# Patient Record
Sex: FEMALE | Race: WHITE | Employment: OTHER | URBAN - NONMETROPOLITAN AREA
[De-identification: names, ages, dates, MRNs, and addresses within clinical notes are randomized per-mention and may not be internally consistent; named-entity substitution may affect disease eponyms.]

---

## 2017-01-01 ENCOUNTER — HOSPITAL ENCOUNTER (EMERGENCY)
Facility: HOSPITAL | Age: 82
Discharge: HOME | End: 2017-11-30
Attending: FAMILY MEDICINE | Admitting: FAMILY MEDICINE
Payer: MEDICARE

## 2017-01-01 ENCOUNTER — DOCUMENTATION ONLY (OUTPATIENT)
Dept: FAMILY MEDICINE | Facility: OTHER | Age: 82
End: 2017-01-01

## 2017-01-01 ENCOUNTER — HOSPITAL ENCOUNTER (EMERGENCY)
Facility: HOSPITAL | Age: 82
Discharge: HOME | End: 2017-10-30
Attending: PHYSICIAN ASSISTANT | Admitting: PHYSICIAN ASSISTANT
Payer: MEDICARE

## 2017-01-01 ENCOUNTER — MEDICAL CORRESPONDENCE (OUTPATIENT)
Dept: HEALTH INFORMATION MANAGEMENT | Facility: HOSPITAL | Age: 82
End: 2017-01-01

## 2017-01-01 ENCOUNTER — APPOINTMENT (OUTPATIENT)
Dept: GENERAL RADIOLOGY | Facility: HOSPITAL | Age: 82
End: 2017-01-01
Attending: PHYSICIAN ASSISTANT
Payer: MEDICARE

## 2017-01-01 ENCOUNTER — NURSING HOME VISIT (OUTPATIENT)
Dept: FAMILY MEDICINE | Facility: OTHER | Age: 82
End: 2017-01-01
Payer: MEDICARE

## 2017-01-01 ENCOUNTER — HOSPITAL ENCOUNTER (OUTPATIENT)
Dept: CARDIOLOGY | Facility: HOSPITAL | Age: 82
End: 2017-11-15
Attending: FAMILY MEDICINE
Payer: MEDICARE

## 2017-01-01 ENCOUNTER — HOSPITAL ENCOUNTER (OUTPATIENT)
Dept: CARDIOLOGY | Facility: HOSPITAL | Age: 82
Discharge: HOME | End: 2017-11-13
Attending: FAMILY MEDICINE | Admitting: FAMILY MEDICINE
Payer: MEDICARE

## 2017-01-01 ENCOUNTER — APPOINTMENT (OUTPATIENT)
Dept: GENERAL RADIOLOGY | Facility: HOSPITAL | Age: 82
End: 2017-01-01
Attending: FAMILY MEDICINE
Payer: MEDICARE

## 2017-01-01 ENCOUNTER — HOSPITAL ENCOUNTER (OUTPATIENT)
Dept: CARDIOLOGY | Facility: HOSPITAL | Age: 82
End: 2017-11-14
Attending: FAMILY MEDICINE
Payer: MEDICARE

## 2017-01-01 VITALS
HEIGHT: 60 IN | OXYGEN SATURATION: 98 % | RESPIRATION RATE: 18 BRPM | SYSTOLIC BLOOD PRESSURE: 140 MMHG | WEIGHT: 164 LBS | BODY MASS INDEX: 32.2 KG/M2 | TEMPERATURE: 97.8 F | HEART RATE: 63 BPM | DIASTOLIC BLOOD PRESSURE: 57 MMHG

## 2017-01-01 VITALS
HEART RATE: 33 BPM | RESPIRATION RATE: 14 BRPM | SYSTOLIC BLOOD PRESSURE: 173 MMHG | TEMPERATURE: 96.5 F | DIASTOLIC BLOOD PRESSURE: 64 MMHG | OXYGEN SATURATION: 98 %

## 2017-01-01 VITALS
SYSTOLIC BLOOD PRESSURE: 143 MMHG | RESPIRATION RATE: 22 BRPM | DIASTOLIC BLOOD PRESSURE: 58 MMHG | TEMPERATURE: 97.4 F | OXYGEN SATURATION: 96 %

## 2017-01-01 DIAGNOSIS — F41.9 ANXIETY: Primary | ICD-10-CM

## 2017-01-01 DIAGNOSIS — J90 PLEURAL EFFUSION: ICD-10-CM

## 2017-01-01 DIAGNOSIS — R00.1 BRADYCARDIA: ICD-10-CM

## 2017-01-01 DIAGNOSIS — I35.0 AORTIC STENOSIS: Primary | ICD-10-CM

## 2017-01-01 DIAGNOSIS — Z78.9 NURSING HOME RESIDENT: Primary | ICD-10-CM

## 2017-01-01 DIAGNOSIS — I35.0 AORTIC STENOSIS: ICD-10-CM

## 2017-01-01 DIAGNOSIS — N18.4 CKD (CHRONIC KIDNEY DISEASE) STAGE 4, GFR 15-29 ML/MIN (H): ICD-10-CM

## 2017-01-01 DIAGNOSIS — R00.1 JUNCTIONAL BRADYCARDIA: ICD-10-CM

## 2017-01-01 DIAGNOSIS — N39.0 UTI (URINARY TRACT INFECTION), BACTERIAL: ICD-10-CM

## 2017-01-01 DIAGNOSIS — A49.9 UTI (URINARY TRACT INFECTION), BACTERIAL: ICD-10-CM

## 2017-01-01 DIAGNOSIS — R05.9 COUGH: ICD-10-CM

## 2017-01-01 DIAGNOSIS — I48.91 ATRIAL FIBRILLATION, UNSPECIFIED TYPE (H): ICD-10-CM

## 2017-01-01 LAB
ALBUMIN SERPL-MCNC: 3.4 G/DL (ref 3.4–5)
ALBUMIN SERPL-MCNC: 3.6 G/DL (ref 3.4–5)
ALBUMIN UR-MCNC: 10 MG/DL
ALBUMIN UR-MCNC: 10 MG/DL
ALP SERPL-CCNC: 103 U/L (ref 40–150)
ALP SERPL-CCNC: 97 U/L (ref 40–150)
ALT SERPL W P-5'-P-CCNC: 27 U/L (ref 0–50)
ALT SERPL W P-5'-P-CCNC: 35 U/L (ref 0–50)
ANION GAP SERPL CALCULATED.3IONS-SCNC: 7 MMOL/L (ref 3–14)
ANION GAP SERPL CALCULATED.3IONS-SCNC: 7 MMOL/L (ref 3–14)
APPEARANCE UR: ABNORMAL
APPEARANCE UR: CLEAR
AST SERPL W P-5'-P-CCNC: 26 U/L (ref 0–45)
AST SERPL W P-5'-P-CCNC: 33 U/L (ref 0–45)
BACTERIA #/AREA URNS HPF: ABNORMAL /HPF
BACTERIA #/AREA URNS HPF: ABNORMAL /HPF
BACTERIA SPEC CULT: ABNORMAL
BASOPHILS # BLD AUTO: 0 10E9/L (ref 0–0.2)
BASOPHILS # BLD AUTO: 0 10E9/L (ref 0–0.2)
BASOPHILS NFR BLD AUTO: 0.3 %
BASOPHILS NFR BLD AUTO: 0.3 %
BILIRUB SERPL-MCNC: 0.7 MG/DL (ref 0.2–1.3)
BILIRUB SERPL-MCNC: 0.9 MG/DL (ref 0.2–1.3)
BILIRUB UR QL STRIP: NEGATIVE
BILIRUB UR QL STRIP: NEGATIVE
BUN SERPL-MCNC: 48 MG/DL (ref 7–30)
BUN SERPL-MCNC: 64 MG/DL (ref 7–30)
CALCIUM SERPL-MCNC: 8.7 MG/DL (ref 8.5–10.1)
CALCIUM SERPL-MCNC: 8.8 MG/DL (ref 8.5–10.1)
CHLORIDE SERPL-SCNC: 104 MMOL/L (ref 94–109)
CHLORIDE SERPL-SCNC: 106 MMOL/L (ref 94–109)
CO2 SERPL-SCNC: 29 MMOL/L (ref 20–32)
CO2 SERPL-SCNC: 29 MMOL/L (ref 20–32)
COLOR UR AUTO: ABNORMAL
COLOR UR AUTO: YELLOW
CREAT SERPL-MCNC: 2.21 MG/DL (ref 0.52–1.04)
CREAT SERPL-MCNC: 2.47 MG/DL (ref 0.52–1.04)
DIFFERENTIAL METHOD BLD: ABNORMAL
DIFFERENTIAL METHOD BLD: ABNORMAL
EOSINOPHIL # BLD AUTO: 0 10E9/L (ref 0–0.7)
EOSINOPHIL # BLD AUTO: 0.1 10E9/L (ref 0–0.7)
EOSINOPHIL NFR BLD AUTO: 0.2 %
EOSINOPHIL NFR BLD AUTO: 0.9 %
ERYTHROCYTE [DISTWIDTH] IN BLOOD BY AUTOMATED COUNT: 14.2 % (ref 10–15)
ERYTHROCYTE [DISTWIDTH] IN BLOOD BY AUTOMATED COUNT: 14.8 % (ref 10–15)
GFR SERPL CREATININE-BSD FRML MDRD: 18 ML/MIN/1.7M2
GFR SERPL CREATININE-BSD FRML MDRD: 21 ML/MIN/1.7M2
GLUCOSE SERPL-MCNC: 107 MG/DL (ref 70–99)
GLUCOSE SERPL-MCNC: 136 MG/DL (ref 70–99)
GLUCOSE UR STRIP-MCNC: NEGATIVE MG/DL
GLUCOSE UR STRIP-MCNC: NEGATIVE MG/DL
HCT VFR BLD AUTO: 42.4 % (ref 35–47)
HCT VFR BLD AUTO: 43.4 % (ref 35–47)
HGB BLD-MCNC: 14 G/DL (ref 11.7–15.7)
HGB BLD-MCNC: 14.3 G/DL (ref 11.7–15.7)
HGB UR QL STRIP: ABNORMAL
HGB UR QL STRIP: NEGATIVE
HYALINE CASTS #/AREA URNS LPF: 3 /LPF
IMM GRANULOCYTES # BLD: 0 10E9/L (ref 0–0.4)
IMM GRANULOCYTES # BLD: 0 10E9/L (ref 0–0.4)
IMM GRANULOCYTES NFR BLD: 0.3 %
IMM GRANULOCYTES NFR BLD: 0.3 %
INR PPP: 1.22 (ref 0.8–1.2)
KETONES UR STRIP-MCNC: NEGATIVE MG/DL
KETONES UR STRIP-MCNC: NEGATIVE MG/DL
LEUKOCYTE ESTERASE UR QL STRIP: ABNORMAL
LEUKOCYTE ESTERASE UR QL STRIP: ABNORMAL
LYMPHOCYTES # BLD AUTO: 1.1 10E9/L (ref 0.8–5.3)
LYMPHOCYTES # BLD AUTO: 1.4 10E9/L (ref 0.8–5.3)
LYMPHOCYTES NFR BLD AUTO: 11.6 %
LYMPHOCYTES NFR BLD AUTO: 18.4 %
MCH RBC QN AUTO: 31.7 PG (ref 26.5–33)
MCH RBC QN AUTO: 31.8 PG (ref 26.5–33)
MCHC RBC AUTO-ENTMCNC: 32.9 G/DL (ref 31.5–36.5)
MCHC RBC AUTO-ENTMCNC: 33 G/DL (ref 31.5–36.5)
MCV RBC AUTO: 96 FL (ref 78–100)
MCV RBC AUTO: 96 FL (ref 78–100)
MONOCYTES # BLD AUTO: 0.8 10E9/L (ref 0–1.3)
MONOCYTES # BLD AUTO: 0.8 10E9/L (ref 0–1.3)
MONOCYTES NFR BLD AUTO: 10.1 %
MONOCYTES NFR BLD AUTO: 8 %
MUCOUS THREADS #/AREA URNS LPF: PRESENT /LPF
MUCOUS THREADS #/AREA URNS LPF: PRESENT /LPF
NEUTROPHILS # BLD AUTO: 5.2 10E9/L (ref 1.6–8.3)
NEUTROPHILS # BLD AUTO: 7.8 10E9/L (ref 1.6–8.3)
NEUTROPHILS NFR BLD AUTO: 70 %
NEUTROPHILS NFR BLD AUTO: 79.6 %
NITRATE UR QL: NEGATIVE
NITRATE UR QL: POSITIVE
NRBC # BLD AUTO: 0 10*3/UL
NRBC # BLD AUTO: 0 10*3/UL
NRBC BLD AUTO-RTO: 0 /100
NRBC BLD AUTO-RTO: 0 /100
NT-PROBNP SERPL-MCNC: ABNORMAL PG/ML (ref 0–1800)
PH UR STRIP: 5.5 PH (ref 4.7–8)
PH UR STRIP: 6.5 PH (ref 4.7–8)
PLATELET # BLD AUTO: 131 10E9/L (ref 150–450)
PLATELET # BLD AUTO: 135 10E9/L (ref 150–450)
POTASSIUM SERPL-SCNC: 4.2 MMOL/L (ref 3.4–5.3)
POTASSIUM SERPL-SCNC: 5.3 MMOL/L (ref 3.4–5.3)
PROT SERPL-MCNC: 7.4 G/DL (ref 6.8–8.8)
PROT SERPL-MCNC: 7.8 G/DL (ref 6.8–8.8)
RBC # BLD AUTO: 4.42 10E12/L (ref 3.8–5.2)
RBC # BLD AUTO: 4.5 10E12/L (ref 3.8–5.2)
RBC #/AREA URNS AUTO: 0 /HPF (ref 0–2)
RBC #/AREA URNS AUTO: 5 /HPF (ref 0–2)
SODIUM SERPL-SCNC: 140 MMOL/L (ref 133–144)
SODIUM SERPL-SCNC: 142 MMOL/L (ref 133–144)
SOURCE: ABNORMAL
SOURCE: ABNORMAL
SP GR UR STRIP: 1.01 (ref 1–1.03)
SP GR UR STRIP: 1.01 (ref 1–1.03)
SPECIMEN SOURCE: ABNORMAL
UROBILINOGEN UR STRIP-MCNC: NORMAL MG/DL (ref 0–2)
UROBILINOGEN UR STRIP-MCNC: NORMAL MG/DL (ref 0–2)
WBC # BLD AUTO: 7.5 10E9/L (ref 4–11)
WBC # BLD AUTO: 9.8 10E9/L (ref 4–11)
WBC #/AREA URNS AUTO: 6 /HPF (ref 0–2)
WBC #/AREA URNS AUTO: >182 /HPF (ref 0–2)
WBC CLUMPS #/AREA URNS HPF: PRESENT /HPF

## 2017-01-01 PROCEDURE — 25000125 ZZHC RX 250: Performed by: PHYSICIAN ASSISTANT

## 2017-01-01 PROCEDURE — 99285 EMERGENCY DEPT VISIT HI MDM: CPT | Mod: 25

## 2017-01-01 PROCEDURE — 80053 COMPREHEN METABOLIC PANEL: CPT | Performed by: PHYSICIAN ASSISTANT

## 2017-01-01 PROCEDURE — 85610 PROTHROMBIN TIME: CPT | Performed by: PHYSICIAN ASSISTANT

## 2017-01-01 PROCEDURE — 71020 XR CHEST 2 VW: CPT | Mod: TC

## 2017-01-01 PROCEDURE — 83880 ASSAY OF NATRIURETIC PEPTIDE: CPT | Performed by: PHYSICIAN ASSISTANT

## 2017-01-01 PROCEDURE — 93005 ELECTROCARDIOGRAM TRACING: CPT

## 2017-01-01 PROCEDURE — 81001 URINALYSIS AUTO W/SCOPE: CPT | Performed by: FAMILY MEDICINE

## 2017-01-01 PROCEDURE — 93010 ELECTROCARDIOGRAM REPORT: CPT | Performed by: INTERNAL MEDICINE

## 2017-01-01 PROCEDURE — 36415 COLL VENOUS BLD VENIPUNCTURE: CPT | Performed by: FAMILY MEDICINE

## 2017-01-01 PROCEDURE — 87186 SC STD MICRODIL/AGAR DIL: CPT | Performed by: PHYSICIAN ASSISTANT

## 2017-01-01 PROCEDURE — 71010 XR CHEST PORT 1 VW: CPT | Mod: TC

## 2017-01-01 PROCEDURE — 85025 COMPLETE CBC W/AUTO DIFF WBC: CPT | Performed by: FAMILY MEDICINE

## 2017-01-01 PROCEDURE — 81001 URINALYSIS AUTO W/SCOPE: CPT | Performed by: PHYSICIAN ASSISTANT

## 2017-01-01 PROCEDURE — 99207 ZZC NO CHARGE LOS: CPT | Mod: GV | Performed by: FAMILY MEDICINE

## 2017-01-01 PROCEDURE — 87086 URINE CULTURE/COLONY COUNT: CPT | Performed by: PHYSICIAN ASSISTANT

## 2017-01-01 PROCEDURE — 80053 COMPREHEN METABOLIC PANEL: CPT | Performed by: FAMILY MEDICINE

## 2017-01-01 PROCEDURE — 93226 XTRNL ECG REC<48 HR SCAN A/R: CPT | Performed by: FAMILY MEDICINE

## 2017-01-01 PROCEDURE — 87088 URINE BACTERIA CULTURE: CPT | Performed by: PHYSICIAN ASSISTANT

## 2017-01-01 PROCEDURE — 99285 EMERGENCY DEPT VISIT HI MDM: CPT | Performed by: FAMILY MEDICINE

## 2017-01-01 PROCEDURE — 25000128 H RX IP 250 OP 636: Performed by: PHYSICIAN ASSISTANT

## 2017-01-01 PROCEDURE — 36415 COLL VENOUS BLD VENIPUNCTURE: CPT | Performed by: PHYSICIAN ASSISTANT

## 2017-01-01 PROCEDURE — 99284 EMERGENCY DEPT VISIT MOD MDM: CPT | Performed by: PHYSICIAN ASSISTANT

## 2017-01-01 PROCEDURE — 85025 COMPLETE CBC W/AUTO DIFF WBC: CPT | Performed by: PHYSICIAN ASSISTANT

## 2017-01-01 PROCEDURE — 94640 AIRWAY INHALATION TREATMENT: CPT

## 2017-01-01 RX ORDER — LORAZEPAM 0.5 MG/1
TABLET ORAL
Qty: 28 TABLET | Refills: 5 | Status: SHIPPED | OUTPATIENT
Start: 2017-01-01

## 2017-01-01 RX ORDER — IPRATROPIUM BROMIDE AND ALBUTEROL SULFATE 2.5; .5 MG/3ML; MG/3ML
3 SOLUTION RESPIRATORY (INHALATION) ONCE
Status: COMPLETED | OUTPATIENT
Start: 2017-01-01 | End: 2017-01-01

## 2017-01-01 RX ORDER — SODIUM CHLORIDE 9 MG/ML
INJECTION, SOLUTION INTRAVENOUS ONCE
Status: COMPLETED | OUTPATIENT
Start: 2017-01-01 | End: 2017-01-01

## 2017-01-01 RX ORDER — LEVOFLOXACIN 500 MG/1
500 TABLET, FILM COATED ORAL EVERY OTHER DAY
Qty: 4 TABLET | Refills: 0 | Status: SHIPPED | OUTPATIENT
Start: 2017-01-01 | End: 2017-01-01

## 2017-01-01 RX ADMIN — IPRATROPIUM BROMIDE AND ALBUTEROL SULFATE 3 ML: .5; 3 SOLUTION RESPIRATORY (INHALATION) at 10:47

## 2017-01-01 RX ADMIN — SODIUM CHLORIDE: 9 INJECTION, SOLUTION INTRAVENOUS at 11:40

## 2017-01-01 ASSESSMENT — ENCOUNTER SYMPTOMS
LIGHT-HEADEDNESS: 1
BRUISES/BLEEDS EASILY: 0
WEAKNESS: 0
WHEEZING: 0
DIZZINESS: 1
FEVER: 0
APPETITE CHANGE: 0
COUGH: 1
NAUSEA: 0
DIARRHEA: 1
CHILLS: 0
SHORTNESS OF BREATH: 0
ACTIVITY CHANGE: 0
FATIGUE: 1
VOMITING: 0
ABDOMINAL PAIN: 0

## 2017-10-30 NOTE — ED PROVIDER NOTES
"  History     Chief Complaint   Patient presents with     Bradycardia     HR 30's with some dizziness     Cough     cough x 2 days     The history is provided by the patient.     Kavya Lee is a 89 year old female who presented to the ED via EMS for evaluation of a mild cough and fatigue for the last 3 days.  The cough has some slight production.  No fevers.  No chest pain.  No appreciable dyspnea.  She does endorse some mild vague dizziness at times, that is mostly present on position change.  No abdominal pain.  No rashes.  No LUTS.  No headaches.  Tells me \"I feel pretty good.\"  Family wanted her checked.  Long PMH as shown.  She presented here bradycardic and from what we can gather, is currently on Metoprolol.      Problem List:    Patient Active Problem List    Diagnosis Date Noted     CAD (coronary artery disease) 03/20/2015     Priority: Medium     Dyspnea 03/20/2015     Priority: Medium     Acute kidney injury (H) 02/01/2015     Priority: Medium     Acute pulmonary edema (H) 02/01/2015     Priority: Medium     Chronic diastolic heart failure (H) 02/01/2015     Priority: Medium     Atrial fibrillation (H) 02/01/2015     Priority: Medium     Acute respiratory failure (H) 01/31/2015     Priority: Medium     ACP (advance care planning) 12/11/2014     Priority: Medium     Advance Care Planning: Receipt of ACP document:  Received: Health Care Directive which was witnessed or notarized on 11-19-14.  Document previously scanned on 12-18-14.  Validation form completed and sent to be scanned.  Code Status reflects choices in most recent ACP document.  Confirmed/documented designated decision maker(s). See permanent comments section of demographics in clinical tab. View document(s) and details by clicking on code status. Added by Felicitas Wakefield RN, System ACP Coordinator [employer] on 12/11/2014.             CHF (congestive heart failure) (H) 12/03/2014     Priority: Medium     Aortic stenosis 12/03/2014 "     Priority: Medium     Mitral valve disorder 12/03/2014     Priority: Medium     Problem list name updated by automated process. Provider to review       Renal failure 12/03/2014     Priority: Medium     Diverticulosis of large intestine 12/03/2014     Priority: Medium     Problem list name updated by automated process. Provider to review       Diabetes mellitus, type 2 (H) 12/03/2014     Priority: Medium     Problem list name updated by automated process. Provider to review       GI bleed 10/28/2014     Priority: Medium     Rectal bleeding 10/27/2014     Priority: Medium        Past Medical History:    Past Medical History:   Diagnosis Date     Aortic stenosis, severe      Atrial fibrillation (H)      Chronic kidney disease (CKD), stage III (moderate)      Diabetes mellitus (H)      Osteoarthritis, generalized      Pure hypercholesterolemia        Past Surgical History:    Past Surgical History:   Procedure Laterality Date     CHOLECYSTECTOMY       COLONOSCOPY N/A 10/29/2014    Procedure: COLONOSCOPY;  Surgeon: Joann Hawkins MD;  Location: [facility]      REMOVAL OF OVARIAN CYST(S)         Family History:    No family history on file.    Social History:  Marital Status:   [4]  Social History   Substance Use Topics     Smoking status: Former Smoker     Smokeless tobacco: Never Used     Alcohol use No        Medications:      Losartan Potassium (COZAAR PO)   levofloxacin (LEVAQUIN) 500 MG tablet   Famotidine (PEPCID PO)   torsemide (DEMADEX) 20 MG tablet   Ferrous Sulfate (IRON SUPPLEMENT PO)   multivitamin, therapeutic with minerals (THERA-VIT-M) TABS   Acetaminophen (TYLENOL PO)   ALLOPURINOL PO   ASPIRIN PO   calcium-vitamin D (CALTRATE) 600-400 MG-UNIT per tablet   Levothyroxine Sodium (SYNTHROID PO)   Nitroglycerin (NITROSTAT SL)   Simvastatin (ZOCOR PO)   POTASSIUM CHLORIDE PO   HYDROcodone-acetaminophen (NORCO) 5-325 MG per tablet   LORazepam (ATIVAN) 0.5 MG tablet   albuterol (2.5 MG/3ML) 0.083%  nebulizer solution   triamcinolone (KENALOG) 0.1 % cream   EPINEPHrine (EPIPEN) 0.3 MG/0.3ML injection   NIFEdipine (PROCARDIA XL PO)         Review of Systems   Constitutional: Positive for fatigue. Negative for activity change, appetite change, chills and fever.   HENT: Negative.    Respiratory: Positive for cough. Negative for shortness of breath and wheezing.    Cardiovascular: Negative for chest pain.   Gastrointestinal: Positive for diarrhea. Negative for abdominal pain, nausea and vomiting.   Genitourinary: Negative.    Skin: Negative.    Neurological: Positive for dizziness and light-headedness. Negative for weakness.   Hematological: Does not bruise/bleed easily.       Physical Exam   BP: 166/67  Heart Rate: (!) 39  Temp: 97.4  F (36.3  C)  Resp: 24  SpO2: 95 %      Physical Exam   Constitutional: She is oriented to person, place, and time. She appears well-developed and well-nourished. No distress.   Pleasant and talkative    HENT:   Mouth/Throat: Oropharynx is clear and moist.   Cardiovascular: Regular rhythm.    Bradycardia   Systolic murmur    Pulmonary/Chest: Effort normal.   Diminished on right    Abdominal: Soft. There is no tenderness. There is no guarding.   Musculoskeletal:   Chronic bilateral edema with signs of CVI.  No cellulitis    Neurological: She is alert and oriented to person, place, and time.   Skin: Skin is warm and dry.   Psychiatric: She has a normal mood and affect.   Nursing note and vitals reviewed.      ED Course     ED Course     Procedures          EKG shows what appears to be a junctional bradycardia.      CXR shows a chronic right pleural effusion.     Medications   ipratropium - albuterol 0.5 mg/2.5 mg/3 mL (DUONEB) neb solution 3 mL (3 mLs Nebulization Given 10/30/17 1047)   0.9% sodium chloride infusion ( Intravenous Stopped 10/30/17 1222)     Results for orders placed or performed during the hospital encounter of 10/30/17 (from the past 24 hour(s))   CBC with platelets  differential   Result Value Ref Range    WBC 9.8 4.0 - 11.0 10e9/L    RBC Count 4.50 3.8 - 5.2 10e12/L    Hemoglobin 14.3 11.7 - 15.7 g/dL    Hematocrit 43.4 35.0 - 47.0 %    MCV 96 78 - 100 fl    MCH 31.8 26.5 - 33.0 pg    MCHC 32.9 31.5 - 36.5 g/dL    RDW 14.2 10.0 - 15.0 %    Platelet Count 135 (L) 150 - 450 10e9/L    Diff Method Automated Method     % Neutrophils 79.6 %    % Lymphocytes 11.6 %    % Monocytes 8.0 %    % Eosinophils 0.2 %    % Basophils 0.3 %    % Immature Granulocytes 0.3 %    Nucleated RBCs 0 0 /100    Absolute Neutrophil 7.8 1.6 - 8.3 10e9/L    Absolute Lymphocytes 1.1 0.8 - 5.3 10e9/L    Absolute Monocytes 0.8 0.0 - 1.3 10e9/L    Absolute Eosinophils 0.0 0.0 - 0.7 10e9/L    Absolute Basophils 0.0 0.0 - 0.2 10e9/L    Abs Immature Granulocytes 0.0 0 - 0.4 10e9/L    Absolute Nucleated RBC 0.0    Comprehensive metabolic panel   Result Value Ref Range    Sodium 140 133 - 144 mmol/L    Potassium 5.3 3.4 - 5.3 mmol/L    Chloride 104 94 - 109 mmol/L    Carbon Dioxide 29 20 - 32 mmol/L    Anion Gap 7 3 - 14 mmol/L    Glucose 136 (H) 70 - 99 mg/dL    Urea Nitrogen 64 (H) 7 - 30 mg/dL    Creatinine 2.47 (H) 0.52 - 1.04 mg/dL    GFR Estimate 18 (L) >60 mL/min/1.7m2    GFR Estimate If Black 22 (L) >60 mL/min/1.7m2    Calcium 8.7 8.5 - 10.1 mg/dL    Bilirubin Total 0.7 0.2 - 1.3 mg/dL    Albumin 3.6 3.4 - 5.0 g/dL    Protein Total 7.8 6.8 - 8.8 g/dL    Alkaline Phosphatase 103 40 - 150 U/L    ALT 35 0 - 50 U/L    AST 33 0 - 45 U/L   Nt probnp inpatient (BNP)   Result Value Ref Range    N-Terminal Pro BNP Inpatient 89630 (H) 0 - 1800 pg/mL   INR   Result Value Ref Range    INR 1.22 (H) 0.80 - 1.20   XR Chest 2 Views    Narrative    PROCEDURE:  XR CHEST 2 VW    HISTORY:  cough.     COMPARISON:  3/21/2015, 12/3/2014    FINDINGS:   The cardiac silhouette is enlarged but stable. The pulmonary  vasculature is normal.  There is a moderate right pleural effusion  with associated atelectasis at the right lung base.  The appearance is  similar to the prior study. The left lung is clear. No pneumothorax.      Impression    IMPRESSION:  Moderate right pleural effusion with associated  atelectasis. This is similar to prior studies.      IWONA CANO MD   UA reflex to Microscopic   Result Value Ref Range    Color Urine Light Yellow     Appearance Urine Cloudy     Glucose Urine Negative NEG^Negative mg/dL    Bilirubin Urine Negative NEG^Negative    Ketones Urine Negative NEG^Negative mg/dL    Specific Gravity Urine 1.009 1.003 - 1.035    Blood Urine Small (A) NEG^Negative    pH Urine 5.5 4.7 - 8.0 pH    Protein Albumin Urine 10 (A) NEG^Negative mg/dL    Urobilinogen mg/dL Normal 0.0 - 2.0 mg/dL    Nitrite Urine Negative NEG^Negative    Leukocyte Esterase Urine Large (A) NEG^Negative    Source Catheterized Urine     RBC Urine 5 (H) 0 - 2 /HPF    WBC Urine >182 (H) 0 - 2 /HPF    WBC Clumps Present (A) NEG^Negative /HPF    Bacteria Urine Few (A) NEG^Negative /HPF    Mucous Urine Present (A) NEG^Negative /LPF    Hyaline Casts 3 (A) OTO2^O - 2 /LPF           Critical Care time:  none               Labs Ordered and Resulted from Time of ED Arrival Up to the Time of Departure from the ED   CBC WITH PLATELETS DIFFERENTIAL - Abnormal; Notable for the following:        Result Value    Platelet Count 135 (*)     All other components within normal limits   COMPREHENSIVE METABOLIC PANEL - Abnormal; Notable for the following:     Glucose 136 (*)     Urea Nitrogen 64 (*)     Creatinine 2.47 (*)     GFR Estimate 18 (*)     GFR Estimate If Black 22 (*)     All other components within normal limits   NT PROBNP INPATIENT - Abnormal; Notable for the following:     N-Terminal Pro BNP Inpatient 43043 (*)     All other components within normal limits   URINE MACROSCOPIC WITH REFLEX TO MICRO - Abnormal; Notable for the following:     Blood Urine Small (*)     Protein Albumin Urine 10 (*)     Leukocyte Esterase Urine Large (*)     RBC Urine 5 (*)     WBC  Urine >182 (*)     WBC Clumps Present (*)     Bacteria Urine Few (*)     Mucous Urine Present (*)     Hyaline Casts 3 (*)     All other components within normal limits   INR - Abnormal; Notable for the following:     INR 1.22 (*)     All other components within normal limits   URINE CULTURE AEROBIC BACTERIAL       Assessments & Plan (with Medical Decision Making)   Kavya looks good.  She is pleasant and talkative.  No distress.  Chronic findings with decreased renal function.  We provided some gentle hydration for her diarrhea.  Stop the Toprol.  Her HR can be followed at [facility].  Levaquin for the UTI. Return her for any worsening or other concerns.  Discussed with Dr. Galvez, who agrees with [facility]. She has a follow-up scheduled.  Kavya and her family voiced complete understanding and were happy and agreeable.     I have reviewed the nursing notes.    I have reviewed the findings, diagnosis, plan and need for follow up with the patient.       New Prescriptions    LEVOFLOXACIN (LEVAQUIN) 500 MG TABLET    Take 1 tablet (500 mg) by mouth every other day for 8 days       Final diagnoses:   Junctional bradycardia   CKD (chronic kidney disease) stage 4, GFR 15-29 ml/min (H)   Cough   Pleural effusion   UTI (urinary tract infection), bacterial       10/30/2017   [facility]     Suyapa Seals PA-C  10/30/17 1229       Suyapa Seals PA-C  10/30/17 1232

## 2017-10-30 NOTE — ED AVS SNAPSHOT
[facility]    [address] [address]    Phone:  [phone]                                       Kavya Lee   MRN: [id number]    Department:  [facility]   Date of Visit:  10/30/2017           Patient Information     Date Of Birth          11/4/1926        Your diagnoses for this visit were:     Junctional bradycardia     CKD (chronic kidney disease) stage 4, GFR 15-29 ml/min (H)     Cough     Pleural effusion     UTI (urinary tract infection), bacterial        You were seen by Suyapa Seals PA-C.      Follow-up Information     Follow up with José Miguel Hendrickson MD.    Specialty:  Family Practice    Contact information:    [facility]  [address] [place] [address] [phone]          Follow up with [facility].    Specialty:  EMERGENCY MEDICINE    Why:  If symptoms worsen    Contact information:    [address] [address] [phone]    Additional information:    From [place]: Take [address]. Turn left at [address]. Turn left at the first stoplight on [address]. At the first stop sign, take a right onto [address]. Take a left into the parking lot and continue through until you reach the North enterance of the building.       From [place]: Take [address]. Take the [address] ramp towards [place]. Turn left onto [address]. Take a slight right onto [address]. Turn left at the first stoplight on [address]. At the first stop sign, take a right onto [address]. Take a left into the parking lot and continue through until you reach the North enterance of the building.       From [place]: Take [address]. Take a right at [address]. At the first stop sign, take a right onto [address]. Take a left into the parking lot and continue through until you reach the North enterance of the building.         Discharge  Instructions       You are taking a medication that slows your heart rate down.      You need to stop this medication and see how things go.     The staff will need to monitor your heart rate a few times a day for the next week or so.     I am going to treat your bladder infection with a drug called Levaquin.  This is taken every other day.      Please follow-up with Dr. Hendrickson in the clinic as scheduled.     You should use your home oxygen as needed.     Please return HERE for ANY worsening symptoms or other concerns whatsoever.            Review of your medicines      START taking        Dose / Directions Last dose taken    levofloxacin 500 MG tablet   Commonly known as:  LEVAQUIN   Dose:  500 mg   Quantity:  4 tablet        Take 1 tablet (500 mg) by mouth every other day for 8 days   Refills:  0          CONTINUE these medicines which may have CHANGED, or have new prescriptions. If we are uncertain of the size of tablets/capsules you have at home, strength may be listed as something that might have changed.        Dose / Directions Last dose taken    torsemide 20 MG tablet   Commonly known as:  DEMADEX   Dose:  40 mg   What changed:  when to take this   Quantity:  30 tablet        Take 2 tablets (40 mg) by mouth 2 times daily   Refills:  0          Our records show that you are taking the medicines listed below. If these are incorrect, please call your family doctor or clinic.        Dose / Directions Last dose taken    albuterol (2.5 MG/3ML) 0.083% neb solution   Dose:  2.5 mg   Quantity:  360 mL        Take 1 vial (2.5 mg) by nebulization every 4 hours as needed for shortness of breath / dyspnea, wheezing or other (dyspnea)   Refills:  0        ALLOPURINOL PO   Dose:  300 mg        Take 300 mg by mouth daily   Refills:  0        ASPIRIN PO   Dose:  81 mg        Take 81 mg by mouth daily   Refills:  0        calcium-vitamin D 600-400 MG-UNIT per tablet   Commonly known as:  CALTRATE   Dose:  1 tablet         Take 1 tablet by mouth 3 times daily   Refills:  0        COZAAR PO   Dose:  50 mg        Take 50 mg by mouth daily   Refills:  0        EPINEPHrine 0.3 MG/0.3ML injection 2-pack   Commonly known as:  EPIPEN/ADRENACLICK/or ANY BX GENERIC EQUIV   Dose:  0.3 mg        Inject 0.3 mg into the muscle once as needed for anaphylaxis   Refills:  0        HYDROcodone-acetaminophen 5-325 MG per tablet   Commonly known as:  NORCO   Dose:  1 tablet   Quantity:  30 tablet        Take 1 tablet by mouth every 6 hours as needed for moderate to severe pain   Refills:  0        IRON SUPPLEMENT PO   Dose:  325 mg        Take 325 mg by mouth 2 times daily (with meals)   Refills:  0        LORazepam 0.5 MG tablet   Commonly known as:  ATIVAN   Dose:  0.5 mg   Quantity:  60 tablet        Take 1 tablet (0.5 mg) by mouth every 4 hours as needed for anxiety   Refills:  0        multivitamin, therapeutic with minerals Tabs tablet   Dose:  1 tablet   Quantity:  30 each        Take 1 tablet by mouth daily   Refills:  0        NITROSTAT SL   Dose:  0.4 mg        Place 0.4 mg under the tongue as needed for chest pain   Refills:  0        PEPCID PO   Dose:  20 mg        Take 20 mg by mouth 2 times daily   Refills:  0        POTASSIUM CHLORIDE PO   Dose:  40 mEq        Take 40 mEq by mouth daily   Refills:  0        PROCARDIA XL PO   Dose:  60 mg        Take 60 mg by mouth daily   Refills:  0        SYNTHROID PO   Dose:  50 mcg        Take 50 mcg by mouth daily   Refills:  0        triamcinolone 0.1 % cream   Commonly known as:  KENALOG   Indication:  Skin Inflammation        Apply topically 4 times daily as needed for irritation   Refills:  0        TYLENOL PO   Dose:  1000 mg        Take 1,000 mg by mouth every 12 hours as needed for mild pain or fever   Refills:  0        ZOCOR PO   Dose:  20 mg        Take 20 mg by mouth At Bedtime   Refills:  0          STOP taking        Dose Reason for stopping Comments    metoprolol 50 MG 24 hr tablet  "  Commonly known as:  TOPROL-XL                      Prescriptions were sent or printed at these locations (1 Prescription)                   [id number] - [place] - [address]   [address] [place] [address]    Telephone:  [phone]   Fax:  [phone]   Hours:                  E-Prescribed (1 of 1)         levofloxacin (LEVAQUIN) 500 MG tablet                Procedures and tests performed during your visit     CBC with platelets differential    Comprehensive metabolic panel    EKG 12-lead, tracing only    INR    Nt probnp inpatient (BNP)    UA reflex to Microscopic    Urine Culture    XR Chest 2 Views      Orders Needing Specimen Collection     None      Pending Results     Date and Time Order Name Status Description    10/30/2017 1157 Urine Culture In process             Pending Culture Results     Date and Time Order Name Status Description    10/30/2017 1157 Urine Culture In process             Thank you for choosing [facility]       Thank you for choosing [facility] for your care. Our goal is always to provide you with excellent care. Hearing back from our patients is one way we can continue to improve our services. Please take a few minutes to complete the written survey that you may receive in the mail after you visit with us. Thank you!        [employer] Information     [employer] lets you send messages to your doctor, view your test results, renew your prescriptions, schedule appointments and more. To sign up, go to [email or web address] . Click on \"Log in\" on the left side of the screen, which will take you to the Welcome page. Then click on \"Sign up Now\" on the right side of the page.     You will be asked to enter the access code listed below, as well as some personal information. Please follow the directions to create your username and password.     Your access code is: [email or web address]  Expires: 2018 11:40 AM     Your access code will  in 90 days. If you need help or a " new code, please call your [facility] clinic or [phone].        Care EveryWhere ID     This is your Care EveryWhere ID. This could be used by other organizations to access your [facility] medical records  [id number]        Equal Access to Services     ALEISHA WADE : Henok Breaux, waaxda luqadaha, qaybta kaalmada claus, ashleigh joe. So [facility] [phone].    ATENCIÓN: Si habla español, tiene a hutton disposición servicios gratuitos de asistencia lingüística. Llame al [phone].    We comply with applicable federal civil rights laws and [place] laws. We do not discriminate on the basis of race, color, national origin, age, disability, sex, sexual orientation, or gender identity.            After Visit Summary       This is your record. Keep this with you and show to your community pharmacist(s) and doctor(s) at your next visit.

## 2017-10-30 NOTE — ED NOTES
"Patient assisted in getting dressed.  Patient with increase SOB with activity but states \"that's nothing new\"  Patient into WC.  DC instructions reviewed with patient and family present.  Report called to [facility]; [place].  "

## 2017-10-30 NOTE — ED NOTES
Patient presents with complaints of cough x 2 days.  Cough is dry and weak.  Patient has also had dizziness on/off x 2 days.  Today nurses noted patient had a HR in the 30's.  Patient denies any other pain or discomfort.  Cardiac monitor on, IV infusing from prehospital, EKG obtained and call light within reach.  Provider at bedside to deisi.

## 2017-10-30 NOTE — DISCHARGE INSTRUCTIONS
You are taking a medication that slows your heart rate down.      You need to stop this medication and see how things go.     The staff will need to monitor your heart rate a few times a day for the next week or so.     I am going to treat your bladder infection with a drug called Levaquin.  This is taken every other day.      Please follow-up with Dr. Hendrickson in the clinic as scheduled.     You should use your home oxygen as needed.     Please return HERE for ANY worsening symptoms or other concerns whatsoever.

## 2017-10-30 NOTE — ED NOTES
Pt back from xray. Monitors on. Pt denies pain. Pt denies SOB. Pt has intermittent non productive cough. Family at bedside. Call light in reach.

## 2017-10-30 NOTE — ED AVS SNAPSHOT
[facility]    [address] [place] [address]    Phone:  [phone]                                       Kavya Lee   MRN: [id number]    Department:  [facility]   Date of Visit:  10/30/2017           After Visit Summary Signature Page     I have received my discharge instructions, and my questions have been answered. I have discussed any challenges I see with this plan with the nurse or doctor.    ..........................................................................................................................................  Patient/Patient Representative Signature      ..........................................................................................................................................  Patient Representative Print Name and Relationship to Patient    ..................................................               ................................................  Date                                            Time    ..........................................................................................................................................  Reviewed by Signature/Title    ...................................................              ..............................................  Date                                                            Time

## 2017-10-30 NOTE — ED NOTES
Pt incontinent of urine. Pt quick cath'ed for urine sample. Pt tolerated well. Urine cloudy and odorous.

## 2017-11-01 NOTE — PROGRESS NOTES
Urine culture with susceptibility final result report routed to Dr Hendrickson whom pt was advised to see. Levaquin prescribed to pt, organism cultured out shows susceptibility to Levaquin.

## 2017-11-30 NOTE — PROGRESS NOTES
Reason for Referral: discharge planning   PCP: Dr JELANI Hendrickson  Cognitive Behavioral Status: sleepy    Affect and Mood Status: he is very sleepy and is not responding to situation    Support System: brother and sister in law    Current Living Situation:    Home Setting: assisted living   Living Situation:  Resident of [facility]   Function Status/Assistive Device: walker with a seat    Employment/Financial/Insurance Concerns:retired      Medicare and [employer]     Patient's insurance coverage:     Status: no  Previous Services:none  Assessment: This is a 89 year old woman who is a patient of Dr Hendrickson.  She lives at [facility].  She was in a space where she was independent now is needing more care.  I did speak with Mony who can have someone help her with dressing and escorting her to and from meals for the next few days until re location is set upo.  Hospice is an option Sat or Sun through [facility].  Information was sent to Gabriella Raygoza. MA paper work provided for the family to be sonja to complete.  I did speak with Dr Hendrickson to update and inquire about support of this plan which he does.      Plan: return to [place] with a plan to relocate and go onto hospice.

## 2017-11-30 NOTE — ED NOTES
Edilma from [facility] called for info, her contact numbers are [phone] or [phone].

## 2017-11-30 NOTE — ED AVS SNAPSHOT
[facility]    [address] [place] [id number]    Phone:  [phone]                                       Kavya Lee   MRN: [id number]    Department:  [facility]   Date of Visit:  11/30/2017           After Visit Summary Signature Page     I have received my discharge instructions, and my questions have been answered. I have discussed any challenges I see with this plan with the nurse or doctor.    ..........................................................................................................................................  Patient/Patient Representative Signature      ..........................................................................................................................................  Patient Representative Print Name and Relationship to Patient    ..................................................               ................................................  Date                                            Time    ..........................................................................................................................................  Reviewed by Signature/Title    ...................................................              ..............................................  Date                                                            Time

## 2017-11-30 NOTE — ED AVS SNAPSHOT
[facility]    [address]    [place] [address]    Phone:  [phone]                                       Kavya Lee   MRN: [id number]    Department:  [facility]   Date of Visit:  11/30/2017           Patient Information     Date Of Birth          11/4/1926        Your diagnoses for this visit were:     Bradycardia        You were seen by Elyssa Gomes MD.      ED Discharge Orders     HOME CARE NURSING REFERRAL       **Order classes of: [facility], [facility] and [facility] will route to the Home Care and Hospice Referral Pool.  Home Care or Hospice will then contact the patient to schedule their appointment.**    If you do not hear from Home Care and Hospice, or you would like to call to schedule, please call the referring place of service that your provider has listed below.  ______________________________________________________________________    Your provider has referred you to: [facility] [place] [phone]   [email or web address]    Extended Emergency Contact Information  Primary Emergency Contact: SHARONA PHILLIPS   [facility] Phone: [phone]  Work Phone: [phone]  Mobile Phone: [phone]  Relation: Brother  Secondary Emergency Contact: LACHELLEQUAN GOODMANN  Address: [address] [place] [address] [facility] Phone: [phone]  Work Phone: [phone]  Mobile Phone: [phone]  Relation: Other    Patient Anticipated Discharge Date: 11/30/17   RN, PT, HHA to begin 24 - 48 hours after discharge.  PLEASE EVALUATE AND TREAT (Evaluation timeline is 24 - 48 hrs. Please call if there is need for a variance to this timeline).    REASON FOR REFERRAL: Hospice - Diagnosis: bradycardia, valvular heart disease    ADDITIONAL SERVICES NEEDED: HHA    OTHER PERTINENT INFORMATION: Patient was last seen by provider on 11/30/17 for bradycardia.    Current Outpatient  Prescriptions:  TORSEMIDE PO, Take 20 mg by mouth daily, Disp: , Rfl:   Famotidine (PEPCID PO), Take 20 mg by mouth 2 times daily, Disp: , Rfl:   Ferrous Sulfate (IRON SUPPLEMENT PO), Take 325 mg by mouth 2 times daily (with meals), Disp: , Rfl:   multivitamin, therapeutic with minerals (THERA-VIT-M) TABS, Take 1 tablet by mouth daily, Disp: 30 each, Rfl: 0  ALLOPURINOL PO, Take 300 mg by mouth daily, Disp: , Rfl:   ASPIRIN PO, Take 81 mg by mouth daily, Disp: , Rfl:   calcium-vitamin D (CALTRATE) 600-400 MG-UNIT per tablet, Take 1 tablet by mouth 3 times daily, Disp: , Rfl:   Levothyroxine Sodium (SYNTHROID PO), Take 50 mcg by mouth daily, Disp: , Rfl:   Simvastatin (ZOCOR PO), Take 20 mg by mouth At Bedtime, Disp: , Rfl:   POTASSIUM CHLORIDE PO, Take 40 mEq by mouth daily, Disp: , Rfl:   albuterol (2.5 MG/3ML) 0.083% nebulizer solution, Take 1 vial (2.5 mg) by nebulization every 4 hours as needed for shortness of breath / dyspnea, wheezing or other (dyspnea), Disp: 360 mL, Rfl:   Acetaminophen (TYLENOL PO), Take 1,000 mg by mouth every 12 hours as needed for mild pain or fever, Disp: , Rfl:   EPINEPHrine (EPIPEN) 0.3 MG/0.3ML injection, Inject 0.3 mg into the muscle once as needed for anaphylaxis, Disp: , Rfl:   Nitroglycerin (NITROSTAT SL), Place 0.4 mg under the tongue as needed for chest pain, Disp: , Rfl:       Patient Active Problem List:     Rectal bleeding     GI bleed     CHF (congestive heart failure) (H)     Aortic stenosis     Mitral valve disorder     Renal failure     Diverticulosis of large intestine     Diabetes mellitus, type 2 (H)     ACP (advance care planning)     Acute respiratory failure (H)     Acute kidney injury (H)     Acute pulmonary edema (H)     Chronic diastolic heart failure (H)     Atrial fibrillation (H)     CAD (coronary artery disease)     Dyspnea      Documentation of Face to Face and Certification for Home Health Services    I certify that patient, Kavya Lee is under  my care and that I, or a Nurse Practitioner or Physician's Assistant working with me, had a face-to-face encounter that meets the physician face-to-face encounter requirements with this patient on: 11/30/2017.    This encounter with the patient was in whole, or in part, for the following medical condition, which is the primary reason for Home Health Care: bradycardia  .    I certify that, based on my findings, the following services are medically necessary Home Health Services: hospice    My clinical findings support the need for the above services because: Nurse is needed: hospice.    Further, I certify that my clinical findings support that this patient is homebound (i.e. absences from home require considerable and taxing effort and are for medical reasons or [facility] services or infrequently or of short duration when for other reasons) because: Patient is bedbound due to: bradycardia.    Based on the above findings, I certify that this patient is confined to the home and needs intermittent skilled nursing care, physical therapy and/or speech therapy.  The patient is under my care, and I have initiated the establishment of the plan of care.  This patient will be followed by a physician who will periodically review the plan of care.    Physician/Provider to provide follow up care: José Miguel Hendrickson certified Physician at time of discharge: Lei Gomes    Please be aware that coverage of these services is subject to the terms and limitations of your health insurance plan.  Call member services at your health plan with any benefit or coverage questions.                     Review of your medicines      Our records show that you are taking the medicines listed below. If these are incorrect, please call your family doctor or clinic.        Dose / Directions Last dose taken    albuterol (2.5 MG/3ML) 0.083% neb solution   Dose:  2.5 mg   Quantity:  360 mL        Take 1 vial (2.5 mg) by nebulization  every 4 hours as needed for shortness of breath / dyspnea, wheezing or other (dyspnea)   Refills:  0        ALLOPURINOL PO   Dose:  300 mg        Take 300 mg by mouth daily   Refills:  0        ASPIRIN PO   Dose:  81 mg        Take 81 mg by mouth daily   Refills:  0        calcium-vitamin D 600-400 MG-UNIT per tablet   Commonly known as:  CALTRATE   Dose:  1 tablet        Take 1 tablet by mouth 3 times daily   Refills:  0        EPINEPHrine 0.3 MG/0.3ML injection 2-pack   Commonly known as:  EPIPEN/ADRENACLICK/or ANY BX GENERIC EQUIV   Dose:  0.3 mg        Inject 0.3 mg into the muscle once as needed for anaphylaxis   Refills:  0        IRON SUPPLEMENT PO   Dose:  325 mg        Take 325 mg by mouth 2 times daily (with meals)   Refills:  0        multivitamin, therapeutic with minerals Tabs tablet   Dose:  1 tablet   Quantity:  30 each        Take 1 tablet by mouth daily   Refills:  0        NITROSTAT SL   Dose:  0.4 mg        Place 0.4 mg under the tongue as needed for chest pain   Refills:  0        PEPCID PO   Dose:  20 mg        Take 20 mg by mouth 2 times daily   Refills:  0        POTASSIUM CHLORIDE PO   Dose:  40 mEq        Take 40 mEq by mouth daily   Refills:  0        SYNTHROID PO   Dose:  50 mcg        Take 50 mcg by mouth daily   Refills:  0        TORSEMIDE PO   Dose:  20 mg        Take 20 mg by mouth daily   Refills:  0        TYLENOL PO   Dose:  1000 mg        Take 1,000 mg by mouth every 12 hours as needed for mild pain or fever   Refills:  0        ZOCOR PO   Dose:  20 mg        Take 20 mg by mouth At Bedtime   Refills:  0                Procedures and tests performed during your visit     CBC with platelets differential    Comprehensive metabolic panel    EKG 12 lead    UA with Microscopic    XR Chest Port 1 View      Orders Needing Specimen Collection     None      Pending Results     No orders found from 11/28/2017 to 12/1/2017.            Pending Culture Results     No orders found from  "2017 to 2017.            Thank you for choosing [facility]       Thank you for choosing [facility] for your care. Our goal is always to provide you with excellent care. Hearing back from our patients is one way we can continue to improve our services. Please take a few minutes to complete the written survey that you may receive in the mail after you visit with us. Thank you!        [employer] To Be Information     Moku lets you send messages to your doctor, view your test results, renew your prescriptions, schedule appointments and more. To sign up, go to [email or web address] . Click on \"Log in\" on the left side of the screen, which will take you to the Welcome page. Then click on \"Sign up Now\" on the right side of the page.     You will be asked to enter the access code listed below, as well as some personal information. Please follow the directions to create your username and password.     Your access code is: [email or web address]  Expires: 2018 10:40 AM     Your access code will  in 90 days. If you need help or a new code, please call your [facility] clinic or [phone].        Care EveryWhere ID     This is your Care EveryWhere ID. This could be used by other organizations to access your [facility] medical records  [id number]        Equal Access to Services     ALEISHA WADE AH: Hadii joy Breaux, waaxda luqadaha, qaybta kaalmada claus, ashleigh joe. So [facility] [phone].    ATENCIÓN: Si habla español, tiene a hutton disposición servicios gratuitos de asistencia lingüística. Llame al [phone].    We comply with applicable federal civil rights laws and [place] laws. We do not discriminate on the basis of race, color, national origin, age, disability, sex, sexual orientation, or gender identity.            After Visit Summary       This is your record. Keep this with you and show to your community pharmacist(s) and doctor(s) at your next visit.                  "

## 2017-12-01 NOTE — ED PROVIDER NOTES
"eMERGENCY dEPARTMENT eNCOUnter        CHIEF COMPLAINT    Chief Complaint   Patient presents with     Altered Mental Status     ender reported that pt was mumbling this am, pt answering questions appropriatley at this time.        HPI    Kavya Lee is a 89 year old female who presents with an altered mental status at her nursing home this morning when she had mumbling speech.  She is now at baseline.  She is accompanied by family.  She has bradycardia and valvular disease.  Family says she's been \"trying to decide\" if she wants any surgery.  They are poa.    REVIEW OF SYSTEMS    Cardiac: no Chest Pain, No syncope  Respiratory: No cough or hemoptysis  GI: No Vomiting or Diarrhea  : No Dysuria or Hematuria  General: No Fever or Chills  All other systems reviewed and are negative.    PAST MEDICAL & SURGICAL HISTORY    Past Medical History:   Diagnosis Date     Aortic stenosis, severe     echo 2012. estimate valve area 1.22     Atrial fibrillation (H)      Chronic kidney disease (CKD), stage III (moderate)      Diabetes mellitus (H)     non insulin requiring. HgbA1c 10/2014 5.8     Osteoarthritis, generalized      Pure hypercholesterolemia      Past Surgical History:   Procedure Laterality Date     CHOLECYSTECTOMY       COLONOSCOPY N/A 10/29/2014    Procedure: COLONOSCOPY;  Surgeon: Joann Hawkins MD;  Location: [facility]      REMOVAL OF OVARIAN CYST(S)         CURRENT MEDICATIONS    Current Outpatient Rx   Medication Sig Dispense Refill     TORSEMIDE PO Take 20 mg by mouth daily       Famotidine (PEPCID PO) Take 20 mg by mouth 2 times daily       Ferrous Sulfate (IRON SUPPLEMENT PO) Take 325 mg by mouth 2 times daily (with meals)       multivitamin, therapeutic with minerals (THERA-VIT-M) TABS Take 1 tablet by mouth daily 30 each 0     ALLOPURINOL PO Take 300 mg by mouth daily       ASPIRIN PO Take 81 mg by mouth daily       calcium-vitamin D (CALTRATE) 600-400 MG-UNIT per tablet Take 1 tablet by mouth 3 " times daily       Levothyroxine Sodium (SYNTHROID PO) Take 50 mcg by mouth daily       Simvastatin (ZOCOR PO) Take 20 mg by mouth At Bedtime       POTASSIUM CHLORIDE PO Take 40 mEq by mouth daily       albuterol (2.5 MG/3ML) 0.083% nebulizer solution Take 1 vial (2.5 mg) by nebulization every 4 hours as needed for shortness of breath / dyspnea, wheezing or other (dyspnea) 360 mL      Acetaminophen (TYLENOL PO) Take 1,000 mg by mouth every 12 hours as needed for mild pain or fever       EPINEPHrine (EPIPEN) 0.3 MG/0.3ML injection Inject 0.3 mg into the muscle once as needed for anaphylaxis       Nitroglycerin (NITROSTAT SL) Place 0.4 mg under the tongue as needed for chest pain         ALLERGIES    Allergies   Allergen Reactions     Bees Anaphylaxis     Penicillins Anaphylaxis     Wasps [Hornets] Anaphylaxis     Food      Sour cream     Lactose        SOCIAL & FAMILY HISTORY    Social History     Social History     Marital status:      Spouse name: N/A     Number of children: N/A     Years of education: N/A     Social History Main Topics     Smoking status: Former Smoker     Smokeless tobacco: Never Used     Alcohol use No     Drug use: No     Sexual activity: Not on file     Other Topics Concern     Not on file     Social History Narrative     No family history on file.    PHYSICAL EXAM    VITAL SIGNS: /64  Pulse (!) 33  Temp 96.5  F (35.8  C) (Tympanic)  Resp 14  SpO2 98%   Constitutional:  Well developed, well nourished, no acute distress,she is elderly, wearing a wig.  Doesn't contribute much to the conversation   HENT:  Atraumatic, moist mucus membranes  Neck: supple, no JVD   Respiratory:  Lungs Clear, no retractions   Cardiovascular:  bradycardic rate, no murmurs  Vascular: Radial pulses 2+ equal bilaterally  GI:  Soft, nontender, normal bowel sounds  Musculoskeletal:  No edema, no acute deformities  Integument:  Skin warm and dry, no petechiae   Neurologic:  Alert & oriented, no slurred  speech  Psych: Pleasant affect, no hallucinations    EKG    Interpreted by emergency department physician:  Junctional bradycardia, no acute ischemic changes    RADIOLOGY/PROCEDURES    Results for orders placed or performed during the hospital encounter of 11/30/17   XR Chest Port 1 View    Narrative    PROCEDURE: XR CHEST PORT 1 VW 11/30/2017 12:40 PM    HISTORY: confusion;     COMPARISONS: 10/30/2017.    TECHNIQUE: Single view.    FINDINGS: Heart is stable in size. It is somewhat enlarged. Left lung  and pleural space are clear. There is a moderate size right pleural  effusion with some adjacent patchy atelectasis or pneumonia.         Impression    IMPRESSION: No significant change in the appearance of the chest.    KAREEN GARCIA MD         ED COURSE & MEDICAL DECISION MAKING        See chart for details of medications given during the ED stay.    Vitals:    11/30/17 1230 11/30/17 1330 11/30/17 1430 11/30/17 1502   BP:  175/61 154/61 173/64   Pulse:    (!) 33   Resp:    14   Temp:    96.5  F (35.8  C)   TempSrc:    Tympanic   SpO2: 98%   98%         FINAL IMPRESSION    1. Bradycardia        Plan: this was a long discussion involving family, social work and Dr. Hendrickson.  She is DNR, family is POA and doesn't believe surgery is in her best interest, which I would agree.  She is in assisted living. Certainly her bradycardia could be contributing to her mental status.  They are making arrangements for her to go to [facility].  In the meantime, a referral for hospice care is sent to [facility] and she is discharged to there.  Family in agreement.     Elyssa Gomes MD  11/30/17 2032

## 2017-12-07 NOTE — TELEPHONE ENCOUNTER
***      Last Written Prescription Date: ***  Last Fill Quantity: ***,  # refills: ***   Last Office Visit with [facility] or [facility] prescribing provider: ***

## 2017-12-11 NOTE — MR AVS SNAPSHOT
"              After Visit Summary   2017    Kavya Lee    MRN: [id number]           Patient Information     Date Of Birth          1926        Visit Information        Provider Department      2017 2:47 PM Mesfin Bishop MD [facility]        Today's Diagnoses     Nursing Home Visit    -  1       Follow-ups after your visit        Who to contact     If you have questions or need follow up information about today's clinic visit or your schedule please contact [facility] directly at [phone].  Normal or non-critical lab and imaging results will be communicated to you by Qustreethart, letter or phone within 4 business days after the clinic has received the results. If you do not hear from us within 7 days, please contact the clinic through Qustreethart or phone. If you have a critical or abnormal lab result, we will notify you by phone as soon as possible.  Submit refill requests through Trendabl or call your pharmacy and they will forward the refill request to us. Please allow 3 business days for your refill to be completed.          Additional Information About Your Visit        MyChart Information     Trendabl lets you send messages to your doctor, view your test results, renew your prescriptions, schedule appointments and more. To sign up, go to [email or web address] . Click on \"Log in\" on the left side of the screen, which will take you to the Welcome page. Then click on \"Sign up Now\" on the right side of the page.     You will be asked to enter the access code listed below, as well as some personal information. Please follow the directions to create your username and password.     Your access code is: [email or web address]  Expires: 2018 10:40 AM     Your access code will  in 90 days. If you need help or a new code, please call your [facility] or [phone].        Care EveryWhere ID     This is your Care EveryWhere ID. This could be used by other " organizations to access your [facility] medical records  [id number]        Your Vitals Were     Pulse Temperature Respirations Height Pulse Oximetry BMI (Body Mass Index)    63 97.8  F (36.6  C) 18 5' (1.524 m) 98% 32.03 kg/m2       Blood Pressure from Last 3 Encounters:   12/11/17 140/57   11/30/17 173/64   10/30/17 143/58    Weight from Last 3 Encounters:   12/11/17 164 lb (74.4 kg)   03/22/15 166 lb 14.2 oz (75.7 kg)   02/04/15 167 lb 8.8 oz (76 kg)              Today, you had the following     No orders found for display       Primary Care Provider Office Phone # Fax #    José Miguel Hendrickson [phone]       [facility] [address] [facility] [address]        Equal Access to Services     [facility]: Hadii joy rodríguez hadasho Soshannan, waaxda luqadaha, qaybta kaalmada adeegyada, ashleigh dwyer haymanuel ornelas . So [facility] [phone].    ATENCIÓN: Si habla español, tiene a hutton disposición servicios gratuitos de asistencia lingüística. Llame al [phone].    We comply with applicable federal civil rights laws and [place] laws. We do not discriminate on the basis of race, color, national origin, age, disability, sex, sexual orientation, or gender identity.            Thank you!     Thank you for choosing [facility]  for your care. Our goal is always to provide you with excellent care. Hearing back from our patients is one way we can continue to improve our services. Please take a few minutes to complete the written survey that you may receive in the mail after your visit with us. Thank you!             Your Updated Medication List - Protect others around you: Learn how to safely use, store and throw away your medicines at www.disposemymeds.org.          This list is accurate as of: 12/11/17 11:59 PM.  Always use your most recent med list.                   Brand Name Dispense Instructions for use Diagnosis    ALLOPURINOL PO      Take 300 mg by mouth daily        ASPIRIN  PO      Take 81 mg by mouth daily        calcium-vitamin D 600-400 MG-UNIT per tablet    CALTRATE     Take 1 tablet by mouth 3 times daily        EPINEPHrine 0.3 MG/0.3ML injection 2-pack    EPIPEN/ADRENACLICK/or ANY BX GENERIC EQUIV     Inject 0.3 mg into the muscle once as needed for anaphylaxis        IRON SUPPLEMENT PO      Take 325 mg by mouth 2 times daily (with meals)        LORazepam 0.5 MG tablet    ATIVAN    28 tablet    Take one (1) tablet BY MOUTH twice daily FOR  ativan    Anxiety       multivitamin, therapeutic with minerals Tabs tablet     30 each    Take 1 tablet by mouth daily    Mitral valve disorders(424.0)       NITROSTAT SL      Place 0.4 mg under the tongue as needed for chest pain        PEPCID PO      Take 20 mg by mouth 2 times daily        POTASSIUM CHLORIDE PO      Take 40 mEq by mouth daily        SYNTHROID PO      Take 50 mcg by mouth daily        TORSEMIDE PO      Take 20 mg by mouth 2 times daily        TYLENOL PO      Take 1,000 mg by mouth every 12 hours as needed for mild pain or fever        ZOCOR PO      Take 20 mg by mouth At Bedtime

## 2017-12-12 PROBLEM — Z78.9 NURSING HOME RESIDENT: Status: ACTIVE | Noted: 2017-01-01

## 2017-12-12 NOTE — PROGRESS NOTES
PHYSICIAN CERTIFICATION OF TERMINAL ILLNESS FOR HOSPICE BENEFIT    December 2, 2017    Kavya Lee    11/4/1926      Effective Date of Certification    Start Date:  12/02/17      End Date:  03/01/18    Terminal Diagnosis:    Aortic Stenosis        Secondary Diagnoses:     Atrial fibrillation     Heart failure            Prognosis Related Comorbidities:    Coronary heart disease     Diabetes mellitus, type 2     Chronic kidney disease           Narrative Statement:  Client suffers from aortic stenosis with  associated heart failure  and is on maximal medical therapy.  She continues to decline. Client resides at [facility]isted Living Facility = and requires assistance with ADL's.  Goals of symptom control will be met.  Because of the progressive nature of the illness and associated comorbidities, client qualifies for hospice care.             I certify that this patient is terminally ill and has a life expectancy of 6 months or less if the terminal illness runs its anticipated course.        Attestation:  I confirm that this narrative was composed by myself and is based on review of the medical record and/or examination of the patient.            Mesfin Bishop MD

## 2017-12-12 NOTE — PROGRESS NOTES
HISTORY OF PRESENT ILLNESS:  Kavya is a 89 year old female (11/4/1926)  resident of [facility]  who is being seen today for a routine 30 day follow up. She has a complicated past medical history and is currently on Hospice.  Kavya suffers from end stage heart failure, atrial fibrillation, VHD (valvular heart disease), as well as coronary heart disease  She does noted dyspnea at rest and is oxygen dependent.  Kavya has continued edema. . Patient offers no other complaint.  Staff notes no other issues.    Current medications, allergies, and interdisciplinary care plan are reviewed.      Patient Active Problem List    Diagnosis Date Noted     CAD (coronary artery disease) 03/20/2015     Priority: Medium     Dyspnea 03/20/2015     Priority: Medium     Acute kidney injury (H) 02/01/2015     Priority: Medium     Acute pulmonary edema (H) 02/01/2015     Priority: Medium     Chronic diastolic heart failure (H) 02/01/2015     Priority: Medium     Atrial fibrillation (H) 02/01/2015     Priority: Medium     Acute respiratory failure (H) 01/31/2015     Priority: Medium     ACP (advance care planning) 12/11/2014     Priority: Medium     Advance Care Planning: Receipt of ACP document:  Received: Health Care Directive which was witnessed or notarized on 11-19-14.  Document previously scanned on 12-18-14.  Validation form completed and sent to be scanned.  Code Status reflects choices in most recent ACP document.  Confirmed/documented designated decision maker(s). See permanent comments section of demographics in clinical tab. View document(s) and details by clicking on code status. Added by Felicitas Wakefield RN, System ACP Coordinator [employer] on 12/11/2014.             CHF (congestive heart failure) (H) 12/03/2014     Priority: Medium     Aortic stenosis 12/03/2014     Priority: Medium     Mitral valve disorder 12/03/2014     Priority: Medium     Problem list name updated by automated process.  Provider to review       Renal failure 12/03/2014     Priority: Medium     Diverticulosis of large intestine 12/03/2014     Priority: Medium     Problem list name updated by automated process. Provider to review       Diabetes mellitus, type 2 (H) 12/03/2014     Priority: Medium     Problem list name updated by automated process. Provider to review       GI bleed 10/28/2014     Priority: Medium     Rectal bleeding 10/27/2014     Priority: Medium          Social History     Social History     Marital status:      Spouse name: N/A     Number of children: N/A     Years of education: N/A     Occupational History     Not on file.     Social History Main Topics     Smoking status: Former Smoker     Smokeless tobacco: Never Used     Alcohol use No     Drug use: No     Sexual activity: Not on file     Other Topics Concern     Not on file     Social History Narrative        Current Outpatient Prescriptions   Medication Sig     LORazepam (ATIVAN) 0.5 MG tablet Take one (1) tablet BY MOUTH twice daily FOR  ativan     TORSEMIDE PO Take 20 mg by mouth 2 times daily      Famotidine (PEPCID PO) Take 20 mg by mouth 2 times daily     Ferrous Sulfate (IRON SUPPLEMENT PO) Take 325 mg by mouth 2 times daily (with meals)     multivitamin, therapeutic with minerals (THERA-VIT-M) TABS Take 1 tablet by mouth daily     Acetaminophen (TYLENOL PO) Take 1,000 mg by mouth every 12 hours as needed for mild pain or fever     ALLOPURINOL PO Take 300 mg by mouth daily     ASPIRIN PO Take 81 mg by mouth daily     calcium-vitamin D (CALTRATE) 600-400 MG-UNIT per tablet Take 1 tablet by mouth 3 times daily     EPINEPHrine (EPIPEN) 0.3 MG/0.3ML injection Inject 0.3 mg into the muscle once as needed for anaphylaxis     Levothyroxine Sodium (SYNTHROID PO) Take 50 mcg by mouth daily     Nitroglycerin (NITROSTAT SL) Place 0.4 mg under the tongue as needed for chest pain     Simvastatin (ZOCOR PO) Take 20 mg by mouth At Bedtime     POTASSIUM  CHLORIDE PO Take 40 mEq by mouth daily     No current facility-administered medications for this visit.        Allergies   Allergen Reactions     Bees Anaphylaxis     Penicillins Anaphylaxis     Wasps [Hornets] Anaphylaxis     Food      Sour cream     Lactose        I have reviewed the care plan and do agree with the plan.      ROS:  No chest pain, fever, chills, headache, nausea, vomiting, dysuria, or changes in bowel habits.  Appetite is normal.  No pain noted.          OBJECTIVE:  /57  Pulse 63  Temp 97.8  F (36.6  C)  Resp 18  Ht 5' (1.524 m)  Wt 164 lb (74.4 kg)  SpO2 98%  BMI 32.03 kg/m2    GENERAL:  Chronically ill appearing, alert, and in no acute distress  RESP:  Lungs clear.  No rales, rhonchi, or wheezing  CV:  IRRR.  S1 S2 with 2/6 systolic murmur. No clicks or rubs.  SKIN:  Age-related changes.  No suspicious lesions or rashes.  PSYCH:  Mentation intact, affect bright, and orientation not assessed.  EXTREM:  3+edema.  Pulses no able to be palpated.          Lab/Diagnostic data:    None    ASSESSMENT/ORDERS:  Nursing Home Visit  Above issues stable.  No changes in current medications or care plan.      Total time spent with patient visit was 25 min including patient visit, review of pertinent clinical information, and treatment plan.      Mesfin Bishop MD